# Patient Record
Sex: FEMALE | Race: WHITE | NOT HISPANIC OR LATINO | Employment: FULL TIME | URBAN - METROPOLITAN AREA
[De-identification: names, ages, dates, MRNs, and addresses within clinical notes are randomized per-mention and may not be internally consistent; named-entity substitution may affect disease eponyms.]

---

## 2022-11-23 ENCOUNTER — OFFICE VISIT (OUTPATIENT)
Dept: INTERNAL MEDICINE CLINIC | Facility: OTHER | Age: 67
End: 2022-11-23

## 2022-11-23 VITALS
DIASTOLIC BLOOD PRESSURE: 68 MMHG | SYSTOLIC BLOOD PRESSURE: 104 MMHG | WEIGHT: 96.2 LBS | TEMPERATURE: 98.6 F | OXYGEN SATURATION: 98 % | BODY MASS INDEX: 16.43 KG/M2 | HEIGHT: 64 IN | RESPIRATION RATE: 18 BRPM | HEART RATE: 47 BPM

## 2022-11-23 DIAGNOSIS — Z13.820 ENCOUNTER FOR OSTEOPOROSIS SCREENING IN ASYMPTOMATIC POSTMENOPAUSAL PATIENT: ICD-10-CM

## 2022-11-23 DIAGNOSIS — M16.10 HIP ARTHRITIS: ICD-10-CM

## 2022-11-23 DIAGNOSIS — Z13.6 ENCOUNTER FOR LIPID SCREENING FOR CARDIOVASCULAR DISEASE: ICD-10-CM

## 2022-11-23 DIAGNOSIS — F51.04 CHRONIC INSOMNIA: ICD-10-CM

## 2022-11-23 DIAGNOSIS — E44.1 MILD PROTEIN-CALORIE MALNUTRITION (HCC): ICD-10-CM

## 2022-11-23 DIAGNOSIS — Z12.11 SCREENING FOR COLON CANCER: ICD-10-CM

## 2022-11-23 DIAGNOSIS — Z13.220 ENCOUNTER FOR LIPID SCREENING FOR CARDIOVASCULAR DISEASE: ICD-10-CM

## 2022-11-23 DIAGNOSIS — Z12.31 ENCOUNTER FOR SCREENING MAMMOGRAM FOR MALIGNANT NEOPLASM OF BREAST: ICD-10-CM

## 2022-11-23 DIAGNOSIS — Z78.0 ENCOUNTER FOR OSTEOPOROSIS SCREENING IN ASYMPTOMATIC POSTMENOPAUSAL PATIENT: ICD-10-CM

## 2022-11-23 DIAGNOSIS — Z11.59 NEED FOR HEPATITIS C SCREENING TEST: ICD-10-CM

## 2022-11-23 DIAGNOSIS — Z53.20 SCREENING FOR HEPATITIS C DECLINED: ICD-10-CM

## 2022-11-23 DIAGNOSIS — M85.88 OSTEOPENIA OF LUMBAR SPINE: Primary | ICD-10-CM

## 2022-11-23 DIAGNOSIS — K21.9 GERD WITHOUT ESOPHAGITIS: ICD-10-CM

## 2022-11-23 DIAGNOSIS — S76.309D HAMSTRING INJURY, SUBSEQUENT ENCOUNTER: ICD-10-CM

## 2022-11-23 PROBLEM — N20.0 KIDNEY STONES: Status: ACTIVE | Noted: 2022-11-23

## 2022-11-23 PROBLEM — N20.0 KIDNEY STONES: Status: RESOLVED | Noted: 2022-11-23 | Resolved: 2022-11-23

## 2022-11-23 RX ORDER — GABAPENTIN 600 MG/1
300 TABLET ORAL 2 TIMES DAILY
COMMUNITY
Start: 2022-11-07

## 2022-11-23 RX ORDER — LIDOCAINE 50 MG/G
1 PATCH TOPICAL DAILY
COMMUNITY
Start: 2022-11-15

## 2022-11-23 RX ORDER — IBUPROFEN 800 MG/1
800 TABLET ORAL 2 TIMES DAILY WITH MEALS
COMMUNITY
Start: 2022-11-07

## 2022-11-23 RX ORDER — PANTOPRAZOLE SODIUM 40 MG/1
40 TABLET, DELAYED RELEASE ORAL DAILY
COMMUNITY

## 2022-11-23 RX ORDER — TRAZODONE HYDROCHLORIDE 50 MG/1
TABLET ORAL
COMMUNITY
Start: 2022-11-07

## 2022-11-23 RX ORDER — METHOCARBAMOL 500 MG/1
500 TABLET, FILM COATED ORAL
COMMUNITY
Start: 2022-11-07

## 2022-11-23 RX ORDER — ESZOPICLONE 1 MG/1
TABLET, FILM COATED ORAL
COMMUNITY
Start: 2022-11-07

## 2022-11-23 NOTE — PROGRESS NOTES
Assessment/Plan:    GERD without esophagitis  Continue pantoprazole 40 mg daily  Hamstring injury, subsequent encounter  Will refer to sports Medicine  Continue gabapentin 300 mg twice a day and ibuprofen 800 mg twice a day  Continue pantoprazole while on ibuprofen  Osteopenia of lumbar spine  Discussed calcium and vitamin-D supplementation  Will check DEXA scan  Hip arthritis  Will refer to sports Medicine  Chronic insomnia  Continue Lunesta 1 mg at bedtime as needed for insomnia and trazodone 50 mg at bedtime as needed for insomnia  Diagnoses and all orders for this visit:    Osteopenia of lumbar spine  -     DXA bone density spine hip and pelvis; Future    Encounter for screening mammogram for malignant neoplasm of breast    Screening for colon cancer  -     Cologuard    Encounter for osteoporosis screening in asymptomatic postmenopausal patient    Hip arthritis  -     Ambulatory Referral to Sports Medicine; Future    Hamstring injury, subsequent encounter  -     Ambulatory Referral to Sports Medicine; Future    GERD without esophagitis    Encounter for lipid screening for cardiovascular disease  -     CBC; Future  -     Comprehensive metabolic panel; Future  -     Lipid panel; Future    Screening for hepatitis C declined    Need for hepatitis C screening test  -     Hepatitis C antibody; Future    Chronic insomnia  -     CBC; Future  -     Comprehensive metabolic panel; Future  -     Lipid panel; Future    Mild protein-calorie malnutrition ([address] )    Other orders  -     eszopiclone (LUNESTA) 1 mg tablet; TAKE 1 TABLET(1 MG) BY MOUTH AT BEDTIME AS NEEDED FOR SLEEP  -     gabapentin (NEURONTIN) 600 MG tablet; Take 300 mg by mouth 2 (two) times a day  -     traZODone (DESYREL) 50 mg tablet; TAKE 1 TABLET(50 MG) BY MOUTH AT NIGHT AS NEEDED FOR SLEEP  -     methocarbamol (ROBAXIN) 500 mg tablet;  Take 500 mg by mouth Takes 250 mg 2 times daily  -     lidocaine (LIDODERM) 5 %; 1 patch daily  - ibuprofen (MOTRIN) 800 mg tablet; Take 800 mg by mouth 2 (two) times a day with meals 2-3 times daily  -     pantoprazole (PROTONIX) 40 mg tablet; Take 40 mg by mouth daily                Subjective:      Patient ID: Taylor Campbell is a 79 y o  female  Chief Complaint   Patient presents with   • Establish Care   • hm     Hep c, PHQ, BMI Adult, BMI f/u plan, mammo, cologuard, dexa    • referral sports medicine     Hamstring issue that she has had for 4 yrs       79year old female is seen today to establish care  She has a PMHx of GERD, insomnia, right hamstring pain, bilateral hips arthritis, and osteopenia  She use to see sports medicine and received steroid injections to the right hamstring, most recent injection was 11/2/2022  She takes lunesta and trazodone for insomnia  She has been compliant with her medication regimen  The following portions of the patient's history were reviewed and updated as appropriate: allergies, current medications, past family history, past medical history, past social history, past surgical history and problem list     Review of Systems   Constitutional: Negative for activity change, appetite change, chills, diaphoresis, fatigue and fever  HENT: Negative for congestion, postnasal drip, rhinorrhea, sinus pressure, sinus pain, sneezing and sore throat  Eyes: Negative for visual disturbance  Respiratory: Negative for apnea, cough, choking, chest tightness, shortness of breath and wheezing  Cardiovascular: Negative for chest pain, palpitations and leg swelling  Gastrointestinal: Negative for abdominal distention, abdominal pain, anal bleeding, blood in stool, constipation, diarrhea, nausea and vomiting  Endocrine: Negative for cold intolerance and heat intolerance  Genitourinary: Negative for difficulty urinating, dysuria and hematuria  Musculoskeletal: Negative  Skin: Negative      Neurological: Negative for dizziness, weakness, light-headedness, numbness and headaches  Hematological: Negative for adenopathy  Psychiatric/Behavioral: Negative for agitation, sleep disturbance and suicidal ideas  All other systems reviewed and are negative  Past Medical History:   Diagnosis Date   • Kidney stones 11/23/2022   • Osteoporosis          Current Outpatient Medications:   •  eszopiclone (LUNESTA) 1 mg tablet, TAKE 1 TABLET(1 MG) BY MOUTH AT BEDTIME AS NEEDED FOR SLEEP, Disp: , Rfl:   •  gabapentin (NEURONTIN) 600 MG tablet, Take 300 mg by mouth 2 (two) times a day, Disp: , Rfl:   •  ibuprofen (MOTRIN) 800 mg tablet, Take 800 mg by mouth 2 (two) times a day with meals 2-3 times daily, Disp: , Rfl:   •  lidocaine (LIDODERM) 5 %, 1 patch daily, Disp: , Rfl:   •  methocarbamol (ROBAXIN) 500 mg tablet, Take 500 mg by mouth Takes 250 mg 2 times daily, Disp: , Rfl:   •  pantoprazole (PROTONIX) 40 mg tablet, Take 40 mg by mouth daily, Disp: , Rfl:   •  traZODone (DESYREL) 50 mg tablet, TAKE 1 TABLET(50 MG) BY MOUTH AT NIGHT AS NEEDED FOR SLEEP, Disp: , Rfl:     No Known Allergies    Social History   Past Surgical History:   Procedure Laterality Date   • ROTATOR CUFF REPAIR Right      Family History   Problem Relation Age of Onset   • Lung cancer Mother    • Breast cancer Maternal Aunt        Objective:  /68 (BP Location: Left arm, Patient Position: Sitting, Cuff Size: Standard)   Pulse (!) 47   Temp 98 6 °F (37 °C) (Temporal)   Resp 18   Ht 5' 3 5" (1 613 m)   Wt 43 6 kg (96 lb 3 2 oz)   SpO2 98%   BMI 16 77 kg/m²     No results found for this or any previous visit (from the past 1344 hour(s))  Physical Exam  Vitals and nursing note reviewed  Constitutional:       General: She is not in acute distress  Appearance: She is well-developed and well-nourished  She is not diaphoretic  HENT:      Head: Normocephalic and atraumatic  Eyes:      General:         Right eye: No discharge  Left eye: No discharge        Extraocular Movements: EOM normal       Conjunctiva/sclera: Conjunctivae normal       Pupils: Pupils are equal, round, and reactive to light  Neck:      Thyroid: No thyromegaly  Vascular: No JVD  Cardiovascular:      Rate and Rhythm: Normal rate and regular rhythm  Pulses: Intact distal pulses  Heart sounds: Normal heart sounds  No murmur heard  No friction rub  No gallop  Pulmonary:      Effort: Pulmonary effort is normal  No respiratory distress  Breath sounds: Normal breath sounds  No wheezing or rales  Chest:      Chest wall: No tenderness  Abdominal:      General: There is no distension  Palpations: Abdomen is soft  Tenderness: There is no abdominal tenderness  Musculoskeletal:         General: No tenderness, deformity or edema  Normal range of motion  Cervical back: Normal range of motion and neck supple  Lymphadenopathy:      Cervical: No cervical adenopathy  Skin:     General: Skin is warm and dry  Coloration: Skin is not pale  Findings: No erythema or rash  Neurological:      Mental Status: She is alert and oriented to person, place, and time  Cranial Nerves: No cranial nerve deficit  Coordination: Coordination normal    Psychiatric:         Mood and Affect: Mood and affect normal          Behavior: Behavior normal          Thought Content:  Thought content normal          Judgment: Judgment normal

## 2022-11-23 NOTE — ASSESSMENT & PLAN NOTE
Continue Lunesta 1 mg at bedtime as needed for insomnia and trazodone 50 mg at bedtime as needed for insomnia

## 2022-11-23 NOTE — ASSESSMENT & PLAN NOTE
Will refer to sports Medicine  Continue gabapentin 300 mg twice a day and ibuprofen 800 mg twice a day  Continue pantoprazole while on ibuprofen

## 2022-11-28 ENCOUNTER — TELEPHONE (OUTPATIENT)
Dept: ADMINISTRATIVE | Facility: OTHER | Age: 67
End: 2022-11-28

## 2022-11-28 NOTE — TELEPHONE ENCOUNTER
----- Message from Monica Clarke MA sent at 11/25/2022  3:07 PM EST -----  Regarding: colonoscopy  11/25/22 3:08 PM    Joan, our patient Piper Mayfield has had CRC: Colonoscopy completed/performed  Please assist in updating the patient chart by making an External outreach to [facility] located in [place]  The date of service is unknown      Thank you,  Monica Clarke, [address]  [facility]

## 2022-11-28 NOTE — TELEPHONE ENCOUNTER
Upon review of the In Basket request and the patient's chart, initial outreach has been made via fax to facility  Please see Contacts section for details       Thank you  Shirley Proctor MA

## 2022-11-28 NOTE — LETTER
Procedure Request Form: Colonoscopy      Date Requested: 22  Patient: Cheryle Ek  Patient : 1955   Referring Provider: Eloise Wisdom, [address] [facility]    Date of Procedure ______________________________       The above patient has informed us that they have completed their   most recent Colonoscopy at your facility  Please complete   this form and attach all corresponding procedure reports/results  Comments __________________________________________________________  ____________________________________________________________________  ____________________________________________________________________  ____________________________________________________________________    Facility Completing Procedure _________________________________________    Form Completed By (print name) _______________________________________      Signature __________________________________________________________      These reports are needed for  compliance  Please fax this completed form and a copy of the procedure report to our office located at [place] [address] as soon as possible to [phone] lavonne Quinonez: Phone [phone]    We thank you for your assistance in treating our mutual patient

## 2022-11-28 NOTE — LETTER
Procedure Request Form: Colonoscopy  Second Request      Date Requested: 22  Patient: Adriana Eugene  Patient : 1955   Referring Provider: Rachel Weiss MD    [address] [place], [address]    Date of Procedure ______________________________       The above patient has informed us that they have completed their   most recent Colonoscopy at your facility  Please complete   this form and attach all corresponding procedure reports/results  Comments __________________________________________________________  ____________________________________________________________________  ____________________________________________________________________  ____________________________________________________________________    Facility Completing Procedure _________________________________________    Form Completed By (print name) _______________________________________      Signature __________________________________________________________      These reports are needed for  compliance  Please fax this completed form and a copy of the procedure report to our office located at [place] [address] as soon as possible to [phone] lavonne Palma: Phone [phone]    We thank you for your assistance in treating our mutual patient

## 2022-12-02 NOTE — TELEPHONE ENCOUNTER
As a follow-up, a second attempt has been made for outreach via fax to facility  Please see Contacts section for details      Thank you  Oibe Flood MA

## 2022-12-05 NOTE — TELEPHONE ENCOUNTER
Upon review of the In Basket request we have found as a result of outreach that patient did not have the requested item(s) completed  Any additional questions or concerns should be emailed to the Practice Liaisons via the appropriate education email address, please do not reply via In Basket      Thank you  Génesis Olea MA

## 2022-12-12 DIAGNOSIS — R19.5 POSITIVE COLORECTAL CANCER SCREENING USING COLOGUARD TEST: Primary | ICD-10-CM

## 2022-12-12 DIAGNOSIS — F51.04 CHRONIC INSOMNIA: Primary | ICD-10-CM

## 2022-12-12 LAB — COLOGUARD RESULT REPORTABLE: POSITIVE

## 2022-12-12 NOTE — TELEPHONE ENCOUNTER
Patient called for refill request on Lunesta - asked to send to [place] on [address]  Last appt 11-  No future appt scheduled    I asked patient if she would like to schedule next visit  She stated she broke her shoulder and had surgery  She is waiting to schedule next follow up visit after her follow up for her shoulder

## 2022-12-13 DIAGNOSIS — R19.5 POSITIVE COLORECTAL CANCER SCREENING USING COLOGUARD TEST: ICD-10-CM

## 2022-12-13 DIAGNOSIS — Z12.12 ENCOUNTER FOR COLORECTAL CANCER SCREENING: Primary | ICD-10-CM

## 2022-12-13 DIAGNOSIS — Z12.11 ENCOUNTER FOR COLORECTAL CANCER SCREENING: Primary | ICD-10-CM

## 2022-12-13 RX ORDER — ESZOPICLONE 1 MG/1
1 TABLET, FILM COATED ORAL
Qty: 30 TABLET | Refills: 0 | Status: SHIPPED | OUTPATIENT
Start: 2022-12-13

## 2022-12-13 NOTE — PROGRESS NOTES
Requesting repeat Cologuard testing she feels her positive Cologuard test may be a false positive as she was experiencing constipation  She is adamant about repeating the cologuard test as oppose to undergoing colonoscopy

## 2023-01-09 ENCOUNTER — PATIENT MESSAGE (OUTPATIENT)
Dept: INTERNAL MEDICINE CLINIC | Facility: OTHER | Age: 68
End: 2023-01-09

## 2023-01-09 DIAGNOSIS — F51.04 CHRONIC INSOMNIA: ICD-10-CM

## 2023-01-11 RX ORDER — ESZOPICLONE 1 MG/1
1 TABLET, FILM COATED ORAL
Qty: 30 TABLET | Refills: 0 | Status: SHIPPED | OUTPATIENT
Start: 2023-01-11

## 2023-01-30 ENCOUNTER — TELEPHONE (OUTPATIENT)
Dept: INTERNAL MEDICINE CLINIC | Facility: OTHER | Age: 68
End: 2023-01-30

## 2023-01-30 NOTE — TELEPHONE ENCOUNTER
----- Message from Karlie Benito sent at 1/17/2023  5:31 PM EST -----  Patient scheduled for 2/15/23    ----- Message -----  From: Veronica Villalpando  Sent: 12/27/2022   2:34 PM EST  To: Erin Kennedy, #    Schedule AWV

## 2023-01-30 NOTE — TELEPHONE ENCOUNTER
Patient canceled because she currently has no insurance and is going to call as soon as she gets insurance again.

## 2023-02-07 DIAGNOSIS — S76.309D HAMSTRING INJURY, SUBSEQUENT ENCOUNTER: Primary | ICD-10-CM

## 2023-02-07 DIAGNOSIS — F51.04 CHRONIC INSOMNIA: ICD-10-CM

## 2023-02-07 RX ORDER — TRAZODONE HYDROCHLORIDE 50 MG/1
50 TABLET ORAL
Qty: 30 TABLET | Refills: 0 | Status: SHIPPED | OUTPATIENT
Start: 2023-02-07

## 2023-02-07 RX ORDER — ESZOPICLONE 1 MG/1
1 TABLET, FILM COATED ORAL
Qty: 30 TABLET | Refills: 0 | Status: SHIPPED | OUTPATIENT
Start: 2023-02-07

## 2023-02-07 RX ORDER — METHOCARBAMOL 500 MG/1
250 TABLET, FILM COATED ORAL 2 TIMES DAILY
Qty: 30 TABLET | Refills: 0 | Status: SHIPPED | OUTPATIENT
Start: 2023-02-07

## 2023-02-14 ENCOUNTER — OFFICE VISIT (OUTPATIENT)
Dept: OBGYN CLINIC | Facility: MEDICAL CENTER | Age: 68
End: 2023-02-14

## 2023-02-14 VITALS
HEIGHT: 64 IN | HEART RATE: 54 BPM | SYSTOLIC BLOOD PRESSURE: 104 MMHG | DIASTOLIC BLOOD PRESSURE: 60 MMHG | WEIGHT: 98 LBS | BODY MASS INDEX: 16.73 KG/M2

## 2023-02-14 DIAGNOSIS — M16.0 PRIMARY OSTEOARTHRITIS OF HIPS, BILATERAL: ICD-10-CM

## 2023-02-14 DIAGNOSIS — M25.551 CHRONIC RIGHT HIP PAIN: Primary | ICD-10-CM

## 2023-02-14 DIAGNOSIS — G89.29 CHRONIC RIGHT HIP PAIN: Primary | ICD-10-CM

## 2023-02-14 NOTE — PROGRESS NOTES
1  Chronic right hip pain  Large joint arthrocentesis: R hip joint      2  Primary osteoarthritis of hips, bilateral  Large joint arthrocentesis: R hip joint        Orders Placed This Encounter   Procedures   • Large joint arthrocentesis: R hip joint        Imaging Studies (I personally reviewed images in PACS and report):    • Ultrasound non-OB transvaginal 6/21/2022: Stable simple appearing right adnexal cyst   Consider 6 to 12-month follow-up pelvic ultrasound  Nonvisualization of the left ovary  Otherwise unremarkable evaluation  • MRI right hip without contrast 2/17/2022: Via [facility]  Images are not available but report is available noting mild right and moderate left hip degenerative changes  Stable right pelvic cystic lesion  • MRI right hip without contrast 11/4/2020: Only report is available noting mild right hip and moderate left hip osteoarthritic changes  No fracture or evidence of osteonecrosis  4 cm right pelvic cystic lesion appears simple, and may be ovarian  • MRI lumbar spine via [facility] in [facility] practices:  1    NONSPECIFIC DEGENERATIVE SPONDYLOSIS SEEN THROUGHOUT THE MID        LUMBAR SPINE  2    NO SIGNIFICANT DISC PATHOLOGY IS SEEN  3    CENTRAL CANAL STENOSIS SEEN AT L4-L5 RELATED PRIMARILY TO        HYPERTROPHIC CHANGES POSTERIORLY   SEE ABOVE DISCUSSION  IMPRESSION:  • Chronic atraumatic right hip posteromedial pain - ongoing for about 4 years  • Prior imaging noting primary OA of bilateral hips  • Reportedly underwent injections of right hip in the past with limited relief  Cannot confirm specifically what was performed  Reports undergoing formal PT for this issue previously with limited relief  • Aggravated when running/ascending/descending  • Neurologically intact of RLE  Full hip ROM, strength on exam      PLAN:    • Clinical exam and radiographic imaging reviewed with patient today, with impression as per above   I have discussed with the patient the pathophysiology of this diagnosis and reviewed how the examination correlates with this diagnosis  • Treatment options were discussed at length and after discussing these treatment options, the patient elected to undergo an ultrasound-guided cortisone injection of her right hip today as per procedure note below  Patient wait until the end of the day to come in around the afternoon to have this procedure done  • Counseled he states that injections can be repeated every 3 to 4 months as needed provided it gives at least 3 months of relief  • Based on patient's description of an injection of her "hamstrings" in the past, I suspect that she may have had an ischial bursa cortisone injection which I am unfamiliar with  We could consider this is an injection for the future from an outside specialty, however on clinical exam any testing done to exacerbate hamstring tension did not exacerbate her pain; nor was she painful over the ischial tuberosity at the origin of her hamstrings  • Patient states that she will follow-up as needed if she feels that this injection is not providing sufficient relief  • Her prior reports/studies were unable to be scanned in today in office and I suggest patient to bring in on follow-up to her chart  Return if symptoms worsen or fail to improve  Portions of the record may have been created with voice recognition software  Occasional wrong word or "sound a like" substitutions may have occurred due to the inherent limitations of voice recognition software  Read the chart carefully and recognize, using context, where substitutions have occurred       I have spent a total time of 50 minutes on 02/16/23 in caring for this patient including Diagnostic results, Prognosis, Risks and benefits of tx options, Instructions for management, Patient and family education, Importance of tx compliance, Risk factor reductions, Impressions, Counseling / Coordination of care, Documenting in the medical record, Reviewing / ordering tests, medicine, procedures   and Obtaining or reviewing history    CHIEF COMPLAINT:  Chief Complaint   Patient presents with   • Right Leg - Pain         HPI:  Carrie Jaimes is a 79 y o  female  who presents for       Visit 2/14/2023:  Initial evaluation of right hip pain:  Ongoing off and on for 4 years  Patient attributes it to her very active lifestyle  She states that it originally was aggravated as she used to run uphill frequently  She also feels that a fall from rollerblading initially exacerbated the issue  She complains of pain has been located over the posteromedial aspect of her hip and has been told in the past that it was from her hamstrings -she points to around her ischial tuberosity  She states that she has undergone formal physical therapy of this issue several times  She states that she had a PRP injection at 1 point without relief  She states that she also had a cortisone injection somewhere along her posterior hip (on chart review I cannot confirm exactly where patient had a cortisone injection) and states that she did have some relief with this  She also had an EMG on 7/12/2022 that was reportedly unremarkable  She has brought in several forms today of her studies that have been done  There are also prior imaging studies as noted above  Patient continues to report pain over the posteromedial aspect of her hip that is aggravated from certain range of motion movements of her hip  Denies any crepitus of her hip  Denies any radiation of pain  Describes as a sharp/aching pain  States it is mild to moderate intensity  States it is not completely stopped her from doing her activities of daily living nor has it woken her up at night but she feels it is continually been chronic without alleviation and is interested in other treatment modalities going forward        Medical, Surgical, Family, and Social History    Past Medical History: Diagnosis Date   • Kidney stones 11/23/2022   • Osteoporosis      Past Surgical History:   Procedure Laterality Date   • ROTATOR CUFF REPAIR Right      Social History   Social History     Substance and Sexual Activity   Alcohol Use Not Currently     Social History     Substance and Sexual Activity   Drug Use Not Currently   • Types: Amphetamines    Comment: in college     Social History     Tobacco Use   Smoking Status Never   Smokeless Tobacco Never     Family History   Problem Relation Age of Onset   • Lung cancer Mother    • Breast cancer Maternal Aunt      No Known Allergies       Physical Exam  /60   Pulse (!) 54   Ht 5' 3 5" (1 613 m)   Wt 44 5 kg (98 lb)   BMI 17 09 kg/m²     Constitutional:  see vital signs  Gen: well-developed, normocephalic/atraumatic, well-groomed  Pulmonary/Chest: Effort normal  No respiratory distress  Right Hip Exam     Tenderness   The patient is experiencing no tenderness  Range of Motion   Flexion: 100   External rotation: 60   Internal rotation: 30     Muscle Strength   Abduction: 5/5   Adduction: 5/5   Flexion: 5/5     Tests   PATRICIA: negative  Shaneka: negative    Comments:  Single-leg bridge: denies pain of hip  FADIR: negative              Large joint arthrocentesis: R hip joint  Universal Protocol:  Consent: Verbal consent obtained  Risks and benefits: risks, benefits and alternatives were discussed  Consent given by: patient  Patient understanding: patient states understanding of the procedure being performed  Site marked: the operative site was marked  Radiology Images displayed and confirmed   If images not available, report reviewed: imaging studies available  Required items: required blood products, implants, devices, and special equipment available  Patient identity confirmed: verbally with patient    Supporting Documentation  Indications: pain   Procedure Details  Location: hip - R hip joint  Preparation: Patient was prepped and draped in the usual sterile fashion  Needle size: 22 G (5" spinal needle)  Ultrasound guidance: yes  Medications administered: 80 mg triamcinolone acetonide 40 mg/mL; 8 mL bupivacaine 0 25 %    Patient tolerance: patient tolerated the procedure well with no immediate complications  Dressing:  Sterile dressing applied    Right  HIP USG INTRA-ARTICULAR INJECTION:  Injection site cleaned with Betadine as well as alcohol prior to anesthetic injection using 5 cc of anesthetic without epinephrine prior to beginning ultrasound-guided  injection procedure  Injection site again cleaned with chlorhexidine prior to starting USG procedure  Sterile field, gloves, probe cover used with visualization of needle in-line with curvilinear transducer LAX to femoral neck with filling of joint capsule at head-neck junction with injectate of anesthetic plus steroid  Femoral artery and circumflex artery visualized medial to injection site on pre-scanning with doppler confirmation  No artery or nerve visualized in path of needle  Patient tolerated procedure well with minimal bleeding and no complications

## 2023-02-16 PROBLEM — M16.0 PRIMARY OSTEOARTHRITIS OF HIPS, BILATERAL: Status: ACTIVE | Noted: 2023-02-16

## 2023-02-16 RX ORDER — TRIAMCINOLONE ACETONIDE 40 MG/ML
80 INJECTION, SUSPENSION INTRA-ARTICULAR; INTRAMUSCULAR
Status: COMPLETED | OUTPATIENT
Start: 2023-02-16 | End: 2023-02-16

## 2023-02-16 RX ORDER — BUPIVACAINE HYDROCHLORIDE 2.5 MG/ML
8 INJECTION, SOLUTION INFILTRATION; PERINEURAL
Status: COMPLETED | OUTPATIENT
Start: 2023-02-16 | End: 2023-02-16

## 2023-02-16 RX ADMIN — TRIAMCINOLONE ACETONIDE 80 MG: 40 INJECTION, SUSPENSION INTRA-ARTICULAR; INTRAMUSCULAR at 07:59

## 2023-02-16 RX ADMIN — BUPIVACAINE HYDROCHLORIDE 8 ML: 2.5 INJECTION, SOLUTION INFILTRATION; PERINEURAL at 07:59

## 2023-02-16 NOTE — PATIENT INSTRUCTIONS

## 2023-02-23 ENCOUNTER — PATIENT MESSAGE (OUTPATIENT)
Dept: INTERNAL MEDICINE CLINIC | Facility: OTHER | Age: 68
End: 2023-02-23

## 2023-02-23 DIAGNOSIS — K21.9 GERD WITHOUT ESOPHAGITIS: ICD-10-CM

## 2023-02-23 DIAGNOSIS — M16.10 HIP ARTHRITIS: ICD-10-CM

## 2023-02-23 DIAGNOSIS — S76.309D HAMSTRING INJURY, SUBSEQUENT ENCOUNTER: Primary | ICD-10-CM

## 2023-02-24 RX ORDER — IBUPROFEN 800 MG/1
800 TABLET ORAL 2 TIMES DAILY WITH MEALS
Qty: 60 TABLET | Refills: 2 | Status: SHIPPED | OUTPATIENT
Start: 2023-02-24

## 2023-02-24 RX ORDER — GABAPENTIN 600 MG/1
300 TABLET ORAL 2 TIMES DAILY
Qty: 30 TABLET | Refills: 2 | Status: SHIPPED | OUTPATIENT
Start: 2023-02-24

## 2023-02-24 RX ORDER — PANTOPRAZOLE SODIUM 40 MG/1
40 TABLET, DELAYED RELEASE ORAL DAILY
Qty: 30 TABLET | Refills: 2 | Status: SHIPPED | OUTPATIENT
Start: 2023-02-24

## 2023-02-24 NOTE — PATIENT COMMUNICATION
Pt stated she did go to Sport Medicine as recommended at last ov  They gave her a Kenalog injection in her hip

## 2023-03-12 DIAGNOSIS — F51.04 CHRONIC INSOMNIA: ICD-10-CM

## 2023-03-13 RX ORDER — ESZOPICLONE 1 MG/1
1 TABLET, FILM COATED ORAL
Qty: 30 TABLET | Refills: 0 | Status: SHIPPED | OUTPATIENT
Start: 2023-03-13

## 2023-03-13 RX ORDER — TRAZODONE HYDROCHLORIDE 50 MG/1
50 TABLET ORAL
Qty: 30 TABLET | Refills: 0 | Status: SHIPPED | OUTPATIENT
Start: 2023-03-13

## 2023-03-15 DIAGNOSIS — S76.309D HAMSTRING INJURY, SUBSEQUENT ENCOUNTER: ICD-10-CM

## 2023-03-16 RX ORDER — METHOCARBAMOL 500 MG/1
250 TABLET, FILM COATED ORAL 2 TIMES DAILY
Qty: 30 TABLET | Refills: 0 | Status: SHIPPED | OUTPATIENT
Start: 2023-03-16

## 2023-03-23 ENCOUNTER — OFFICE VISIT (OUTPATIENT)
Dept: INTERNAL MEDICINE CLINIC | Facility: OTHER | Age: 68
End: 2023-03-23

## 2023-03-23 VITALS
SYSTOLIC BLOOD PRESSURE: 100 MMHG | HEIGHT: 64 IN | OXYGEN SATURATION: 98 % | BODY MASS INDEX: 15.71 KG/M2 | HEART RATE: 70 BPM | TEMPERATURE: 98.2 F | WEIGHT: 92 LBS | DIASTOLIC BLOOD PRESSURE: 60 MMHG

## 2023-03-23 DIAGNOSIS — M16.10 HIP ARTHRITIS: ICD-10-CM

## 2023-03-23 DIAGNOSIS — S76.309D HAMSTRING INJURY, SUBSEQUENT ENCOUNTER: ICD-10-CM

## 2023-03-23 DIAGNOSIS — F51.04 CHRONIC INSOMNIA: Primary | ICD-10-CM

## 2023-03-23 DIAGNOSIS — N64.82 MICROMASTIA: ICD-10-CM

## 2023-03-23 DIAGNOSIS — E44.1 MILD PROTEIN-CALORIE MALNUTRITION (HCC): ICD-10-CM

## 2023-03-23 DIAGNOSIS — K21.9 GERD WITHOUT ESOPHAGITIS: ICD-10-CM

## 2023-03-23 DIAGNOSIS — M85.88 OSTEOPENIA OF LUMBAR SPINE: ICD-10-CM

## 2023-03-23 RX ORDER — LIDOCAINE 50 MG/G
1 PATCH TOPICAL DAILY
Qty: 30 PATCH | Refills: 5 | Status: SHIPPED | OUTPATIENT
Start: 2023-03-23

## 2023-03-23 RX ORDER — NUTRITIONAL SUPPLEMENT
1 POWDER (GRAM) ORAL DAILY
COMMUNITY

## 2023-03-23 NOTE — ASSESSMENT & PLAN NOTE
She would like to wean off lunesta  She is hopeful that the Coco Darcie will help with her hamstring pain and if so will wean off lunesta  Continue lunesta and trazodone 50 mg HS PRN

## 2023-03-23 NOTE — PROGRESS NOTES
Assessment/Plan:    Mild protein-calorie malnutrition ([address] )  Discussed diet and exercise  Hamstring injury, subsequent encounter  Discussed ROM and stretching exercises  Continue methocarbamol 250 mg BID PRN, lidocaine patches, and Deandre supplementation  Hip arthritis  Continue gabapentin and current pain regimen and follow-up with orthopedic surgery as needed  Osteopenia of lumbar spine  Continue calcium and vitamin D supplementation  DEXA scan ordered  GERD without esophagitis  Controlled with pantoprazole 40 mg daily PRN  Chronic insomnia  She would like to wean off lunesta  She is hopeful that the [employer] will help with her hamstring pain and if so will wean off lunesta  Continue lunesta and trazodone 50 mg HS PRN  Micromastia  We will order an ABUS for breast cancer screening due to micromastia  She has tried mammograms in the past however was not able to have completed due to micromastia  Diagnoses and all orders for this visit:    Chronic insomnia    Osteopenia of lumbar spine  -     DXA bone density spine hip and pelvis; Future    Hamstring injury, subsequent encounter  -     lidocaine (LIDODERM) 5 %; Apply 1 patch topically over 24 hours daily    Hip arthritis  -     lidocaine (LIDODERM) 5 %; Apply 1 patch topically over 24 hours daily    Mild protein-calorie malnutrition (HCC)    GERD without esophagitis    Micromastia  -     US breast screening bilateral complete (ABUS); Future    Other orders  -     Nutritional Supplements (Deandre) POWD; Take 1 Package by mouth in the morning        BMI Counseling: Body mass index is 16 04 kg/m²  The BMI is below normal  Patient advised to gain weight  Patient referred to PCP  Rationale for BMI follow-up plan is due to patient being underweight  Depression Screening and Follow-up Plan: Patient was screened for depression during today's encounter  They screened negative with a PHQ-2 score of 0             Subjective:      Patient ID: Cristofer Dawsontomasa Latasha Guajardo is a 79 y o  female  Chief Complaint   Patient presents with   • Follow-up     Pt came out of Medicare, She has regular commercial insurance now  NO AWV TODAY ( could not obtain info from colonoscopy @ [facility] in [facility], Patient states she has been using phenergan injections    • HM     Depression sc, Mammo- refused , DXA-order placed        79year old female is seen today for follow up of chronic conditions           The following portions of the patient's history were reviewed and updated as appropriate: allergies, current medications, past family history, past medical history, past social history, past surgical history and problem list     Review of Systems      Past Medical History:   Diagnosis Date   • Kidney stones 11/23/2022   • Osteoporosis          Current Outpatient Medications:   •  eszopiclone (LUNESTA) 1 mg tablet, Take 1 tablet (1 mg total) by mouth daily at bedtime as needed for sleep Take immediately before bedtime, Disp: 30 tablet, Rfl: 0  •  gabapentin (NEURONTIN) 600 MG tablet, Take 0 5 tablets (300 mg total) by mouth 2 (two) times a day, Disp: 30 tablet, Rfl: 2  •  ibuprofen (MOTRIN) 800 mg tablet, Take 1 tablet (800 mg total) by mouth 2 (two) times a day with meals 2-3 times daily, Disp: 60 tablet, Rfl: 2  •  lidocaine (LIDODERM) 5 %, Apply 1 patch topically over 24 hours daily, Disp: 30 patch, Rfl: 5  •  methocarbamol (ROBAXIN) 500 mg tablet, Take 0 5 tablets (250 mg total) by mouth 2 (two) times a day Takes 250 mg 2 times daily, Disp: 30 tablet, Rfl: 0  •  Nutritional Supplements (Deandre) POWD, Take 1 Package by mouth in the morning, Disp: , Rfl:   •  pantoprazole (PROTONIX) 40 mg tablet, Take 1 tablet (40 mg total) by mouth daily, Disp: 30 tablet, Rfl: 2  •  traZODone (DESYREL) 50 mg tablet, Take 1 tablet (50 mg total) by mouth daily at bedtime as needed for sleep, Disp: 30 tablet, Rfl: 0    No Known Allergies    Social History   Past Surgical History:   Procedure Laterality Date   • ROTATOR CUFF REPAIR Right      Family History   Problem Relation Age of Onset   • Lung cancer Mother    • Breast cancer Maternal Aunt        Objective:  /60 (BP Location: Left arm, Patient Position: Sitting, Cuff Size: Standard)   Pulse 70   Temp 98 2 °F (36 8 °C) (Temporal)   Ht 5' 3 5" (1 613 m)   Wt 41 7 kg (92 lb)   SpO2 98%   BMI 16 04 kg/m²     No results found for this or any previous visit (from the past 1344 hour(s))           Physical Exam

## 2023-03-23 NOTE — ASSESSMENT & PLAN NOTE
We will order an ABUS for breast cancer screening due to micromastia  She has tried mammograms in the past however was not able to have completed due to micromastia

## 2023-03-23 NOTE — ASSESSMENT & PLAN NOTE
Discussed ROM and stretching exercises  Continue methocarbamol 250 mg BID PRN, lidocaine patches, and Deandre supplementation

## 2023-03-29 ENCOUNTER — PATIENT MESSAGE (OUTPATIENT)
Dept: INTERNAL MEDICINE CLINIC | Facility: OTHER | Age: 68
End: 2023-03-29

## 2023-03-29 DIAGNOSIS — S76.309D HAMSTRING INJURY, SUBSEQUENT ENCOUNTER: Primary | ICD-10-CM

## 2023-03-29 DIAGNOSIS — M16.10 HIP ARTHRITIS: ICD-10-CM

## 2023-03-29 DIAGNOSIS — M16.0 PRIMARY OSTEOARTHRITIS OF HIPS, BILATERAL: ICD-10-CM

## 2023-03-29 RX ORDER — METHYLPREDNISOLONE 4 MG/1
TABLET ORAL
Qty: 21 EACH | Refills: 0 | Status: SHIPPED | OUTPATIENT
Start: 2023-03-29

## 2023-04-05 DIAGNOSIS — M16.10 HIP ARTHRITIS: ICD-10-CM

## 2023-04-05 DIAGNOSIS — S76.309D HAMSTRING INJURY, SUBSEQUENT ENCOUNTER: ICD-10-CM

## 2023-04-06 RX ORDER — GABAPENTIN 600 MG/1
300 TABLET ORAL 2 TIMES DAILY
Qty: 30 TABLET | Refills: 0 | OUTPATIENT
Start: 2023-04-06

## 2023-04-28 ENCOUNTER — TELEPHONE (OUTPATIENT)
Dept: OBGYN CLINIC | Facility: MEDICAL CENTER | Age: 68
End: 2023-04-28

## 2023-04-28 NOTE — TELEPHONE ENCOUNTER
Caller: Patient    Doctor: Kirk Reynoso    Reason for call:     Patient is calling for another injection, she would like to schedule an ultrasound-guided cortisone injection of her right hip  Last one was done on 02/14/2023  She is asking for a call to schedule this injection       Call back#: [phone]

## 2023-05-16 DIAGNOSIS — S76.309D HAMSTRING INJURY, SUBSEQUENT ENCOUNTER: ICD-10-CM

## 2023-05-16 DIAGNOSIS — F51.04 CHRONIC INSOMNIA: ICD-10-CM

## 2023-05-16 RX ORDER — METHOCARBAMOL 500 MG/1
250 TABLET, FILM COATED ORAL 2 TIMES DAILY
Qty: 30 TABLET | Refills: 2 | Status: SHIPPED | OUTPATIENT
Start: 2023-05-16

## 2023-05-16 RX ORDER — TRAZODONE HYDROCHLORIDE 50 MG/1
50 TABLET ORAL
Qty: 30 TABLET | Refills: 2 | Status: SHIPPED | OUTPATIENT
Start: 2023-05-16

## 2023-05-23 DIAGNOSIS — M16.10 HIP ARTHRITIS: ICD-10-CM

## 2023-05-23 DIAGNOSIS — S76.309D HAMSTRING INJURY, SUBSEQUENT ENCOUNTER: ICD-10-CM

## 2023-05-23 DIAGNOSIS — F51.04 CHRONIC INSOMNIA: ICD-10-CM

## 2023-05-23 RX ORDER — IBUPROFEN 800 MG/1
800 TABLET ORAL 2 TIMES DAILY WITH MEALS
Qty: 60 TABLET | Refills: 0 | Status: SHIPPED | OUTPATIENT
Start: 2023-05-23

## 2023-05-24 RX ORDER — ESZOPICLONE 1 MG/1
1 TABLET, FILM COATED ORAL
Qty: 30 TABLET | Refills: 0 | Status: SHIPPED | OUTPATIENT
Start: 2023-05-24

## 2023-06-05 DIAGNOSIS — M16.10 HIP ARTHRITIS: ICD-10-CM

## 2023-06-05 DIAGNOSIS — S76.309D HAMSTRING INJURY, SUBSEQUENT ENCOUNTER: ICD-10-CM

## 2023-06-05 RX ORDER — GABAPENTIN 600 MG/1
300 TABLET ORAL 2 TIMES DAILY
Qty: 30 TABLET | Refills: 2 | Status: SHIPPED | OUTPATIENT
Start: 2023-06-05

## 2023-06-21 DIAGNOSIS — M16.10 HIP ARTHRITIS: ICD-10-CM

## 2023-06-21 DIAGNOSIS — S76.309D HAMSTRING INJURY, SUBSEQUENT ENCOUNTER: ICD-10-CM

## 2023-06-21 RX ORDER — IBUPROFEN 800 MG/1
800 TABLET ORAL 2 TIMES DAILY WITH MEALS
Qty: 60 TABLET | Refills: 0 | Status: SHIPPED | OUTPATIENT
Start: 2023-06-21

## 2023-06-28 DIAGNOSIS — F51.04 CHRONIC INSOMNIA: ICD-10-CM

## 2023-06-28 RX ORDER — ESZOPICLONE 1 MG/1
1 TABLET, FILM COATED ORAL
Qty: 30 TABLET | Refills: 0 | Status: SHIPPED | OUTPATIENT
Start: 2023-06-28

## 2023-07-11 ENCOUNTER — OFFICE VISIT (OUTPATIENT)
Dept: OBGYN CLINIC | Facility: CLINIC | Age: 68
End: 2023-07-11
Payer: COMMERCIAL

## 2023-07-11 VITALS
WEIGHT: 100.2 LBS | DIASTOLIC BLOOD PRESSURE: 60 MMHG | HEIGHT: 64 IN | SYSTOLIC BLOOD PRESSURE: 100 MMHG | HEART RATE: 55 BPM | BODY MASS INDEX: 17.11 KG/M2 | TEMPERATURE: 98 F

## 2023-07-11 DIAGNOSIS — M16.0 PRIMARY OSTEOARTHRITIS OF HIPS, BILATERAL: ICD-10-CM

## 2023-07-11 DIAGNOSIS — M25.551 CHRONIC RIGHT HIP PAIN: Primary | ICD-10-CM

## 2023-07-11 DIAGNOSIS — G89.29 CHRONIC RIGHT HIP PAIN: Primary | ICD-10-CM

## 2023-07-11 PROCEDURE — S0020 INJECTION, BUPIVICAINE HYDRO: HCPCS | Performed by: STUDENT IN AN ORGANIZED HEALTH CARE EDUCATION/TRAINING PROGRAM

## 2023-07-11 PROCEDURE — 20611 DRAIN/INJ JOINT/BURSA W/US: CPT | Performed by: STUDENT IN AN ORGANIZED HEALTH CARE EDUCATION/TRAINING PROGRAM

## 2023-07-11 PROCEDURE — 99213 OFFICE O/P EST LOW 20 MIN: CPT | Performed by: STUDENT IN AN ORGANIZED HEALTH CARE EDUCATION/TRAINING PROGRAM

## 2023-07-11 RX ORDER — BUPIVACAINE HYDROCHLORIDE 2.5 MG/ML
4 INJECTION, SOLUTION INFILTRATION; PERINEURAL
Status: COMPLETED | OUTPATIENT
Start: 2023-07-11 | End: 2023-07-11

## 2023-07-11 RX ORDER — TRIAMCINOLONE ACETONIDE 40 MG/ML
80 INJECTION, SUSPENSION INTRA-ARTICULAR; INTRAMUSCULAR
Status: COMPLETED | OUTPATIENT
Start: 2023-07-11 | End: 2023-07-11

## 2023-07-11 RX ADMIN — BUPIVACAINE HYDROCHLORIDE 4 ML: 2.5 INJECTION, SOLUTION INFILTRATION; PERINEURAL at 14:15

## 2023-07-11 RX ADMIN — TRIAMCINOLONE ACETONIDE 80 MG: 40 INJECTION, SUSPENSION INTRA-ARTICULAR; INTRAMUSCULAR at 14:15

## 2023-07-11 NOTE — PROGRESS NOTES
1. Chronic right hip pain  Large joint arthrocentesis: R hip joint      2. Primary osteoarthritis of hips, bilateral  Large joint arthrocentesis: R hip joint        Orders Placed This Encounter   Procedures   • Large joint arthrocentesis: R hip joint        Imaging Studies (I personally reviewed images in PACS and report):    • Ultrasound non-OB transvaginal 6/21/2022: Stable simple appearing right adnexal cyst.  Consider 6 to 12-month follow-up pelvic ultrasound. Nonvisualization of the left ovary. Otherwise unremarkable evaluation. • MRI right hip without contrast 2/17/2022: Via [facility]. Images are not available but report is available noting mild right and moderate left hip degenerative changes. Stable right pelvic cystic lesion. • MRI right hip without contrast 11/4/2020: Only report is available noting mild right hip and moderate left hip osteoarthritic changes. No fracture or evidence of osteonecrosis. 4 cm right pelvic cystic lesion appears simple, and may be ovarian. • MRI lumbar spine via [facility] in [facility] practices:  1.   NONSPECIFIC DEGENERATIVE SPONDYLOSIS SEEN THROUGHOUT THE MID        LUMBAR SPINE. 2.   NO SIGNIFICANT DISC PATHOLOGY IS SEEN. 3.   CENTRAL CANAL STENOSIS SEEN AT L4-L5 RELATED PRIMARILY TO        HYPERTROPHIC CHANGES POSTERIORLY.  SEE ABOVE DISCUSSION. IMPRESSION:  • Acute on chronic atraumatic right hip posteromedial pain   • Prior imaging noting primary OA of bilateral hips  • Previously sustained relief from intra-articular hip cortisone injections in the past      PLAN:    • Clinical exam and radiographic imaging reviewed with patient today, with impression as per above. I have discussed with the patient the pathophysiology of this diagnosis and reviewed how the examination correlates with this diagnosis.     • Treatment options were discussed at length and after discussing these treatment options, the patient elected to undergo an ultrasound-guided cortisone injection of her right hip today as per procedure note below. • Counseled he states that injections can be repeated every 3 to 4 months as needed provided it gives at least 3 months of relief. Return if symptoms worsen or fail to improve. Portions of the record may have been created with voice recognition software. Occasional wrong word or "sound a like" substitutions may have occurred due to the inherent limitations of voice recognition software. Read the chart carefully and recognize, using context, where substitutions have occurred. CHIEF COMPLAINT:  Chief Complaint   Patient presents with   • Follow-up         HPI:  Hardy Griffith is a 79 y.o. female  who presents for     Visit 7/11/2023: Follow-up right hip pain:  Patient was last seen on 2/14/2023 in which she was given an ultrasound-guided intra-articular hip injection with cortisone previously. She states she sustained relief from this injection and was doing well for several months. She states recently she has noticed progressively worsening pain over the anterior and posterior aspects of her hip. Denies new injuries. She states the pain is feeling similar as it did previously but not as severe. She is interested in whether she have a repeat injection today before the pain becomes worse. She states she lives a very active lifestyle and the pain can be aggravated from jogging as well as long-term cycling. Denies any hip crepitus. Denies hip swelling. Pain is of mild to moderate intensity.       Medical, Surgical, Family, and Social History    Past Medical History:   Diagnosis Date   • Kidney stones 11/23/2022   • Osteoporosis      Past Surgical History:   Procedure Laterality Date   • ROTATOR CUFF REPAIR Right      Social History   Social History     Substance and Sexual Activity   Alcohol Use Not Currently     Social History     Substance and Sexual Activity   Drug Use Not Currently   • Types: Amphetamines    Comment: in college     Social History     Tobacco Use   Smoking Status Never   Smokeless Tobacco Never     Family History   Problem Relation Age of Onset   • Lung cancer Mother    • Breast cancer Maternal Aunt      No Known Allergies       Physical Exam  /60   Pulse 55   Temp 98 °F (36.7 °C) (Temporal)   Ht 5' 3.5" (1.613 m)   Wt 45.5 kg (100 lb 3.2 oz)   BMI 17.47 kg/m²     Constitutional:  see vital signs  Gen: well-developed, normocephalic/atraumatic, well-groomed  Pulmonary/Chest: Effort normal. No respiratory distress. Right Hip Exam     Tenderness   The patient is experiencing no tenderness. Range of Motion   Flexion: 100   External rotation: 60   Internal rotation: 30     Muscle Strength   Right hip normal muscle strength: Resited hip flexion aggravatesanterior hip pain. Abduction: 5/5   Adduction: 5/5   Flexion: 5/5     Tests   PATRICIA: negative  Shaneka: negative    Other   Erythema: absent    Comments:  Single-leg bridge: denies pain of hip  FADIR: +              Large joint arthrocentesis: R hip joint  Universal Protocol:  Consent: Verbal consent obtained. Risks and benefits: risks, benefits and alternatives were discussed  Consent given by: patient  Patient understanding: patient states understanding of the procedure being performed  Site marked: the operative site was marked  Radiology Images displayed and confirmed.  If images not available, report reviewed: imaging studies available  Required items: required blood products, implants, devices, and special equipment available  Patient identity confirmed: verbally with patient    Supporting Documentation  Indications: pain   Procedure Details  Location: hip - R hip joint  Preparation: Patient was prepped and draped in the usual sterile fashion  Needle size: 20 G (6" spinal needle)  Ultrasound guidance: yes  Medications administered: 80 mg triamcinolone acetonide 40 mg/mL; 4 mL bupivacaine 0.25 %    Patient tolerance: patient tolerated the procedure well with no immediate complications  Dressing:  Sterile dressing applied    Right HIP USG INTRA-ARTICULAR INJECTION:  Injection site cleaned with Betadine as well as alcohol prior to anesthetic injection using 5 cc of anesthetic without epinephrine prior to beginning ultrasound-guided  injection procedure. Injection site again cleaned with chlorhexidine prior to starting USG procedure. Sterile field, gloves, probe cover used with visualization of needle in-line with curvilinear transducer LAX to femoral neck with filling of joint capsule at head-neck junction with injectate of 4ml anesthetic plus steroid. Femoral artery and circumflex artery visualized medial to injection site on pre-scanning with doppler confirmation. No artery or nerve visualized in path of needle. Patient tolerated procedure well with minimal bleeding and no complications.

## 2023-07-11 NOTE — PATIENT INSTRUCTIONS
CORTICOSTEROID INJECTION  What is a corticosteroid? Injuries or disease such as arthritis, bursitis or tendonitis result in inflammation. In turn, this inflammation can cause swelling and pain. A local injection of a corticosteroid is provided to diminish inflammation. By doing so, it will also decrease pain and swelling which is making you uncomfortable. Is this the same thing as a Cortisone Injection? Cortisone® is a brand name of a corticosteroid used commonly in the past.  Today I commonly use a more water-soluble corticosteroid named Celestone®. Will the injection hurt? As with any injection, you may feel pain at the time of the injection. Typically, I use a local anesthetic (Zahira Jennifer) in addition to the corticosteroid to determine if the injection has been placed in the appropriate location. Hence it is important to monitor your symptoms 4-6 hours after the injection, as the area will be anesthetized (numb) while the local anesthetic is working. Once the local anesthetic wears off, the intensity of pain can be the same as it was prior to the injection, or even worse. This does not mean that the injection is not working. The corticosteroid may take 24-72 hours to begin having a positive effect. If you do experience an increase in pain, the use of an ice pack on the area for 20 minutes at a time should help. It is also helpful to take an oral anti-inflammatory such as Tylenol® or Motrin® if you are able to medically do so. For this reason it is best to avoid activities that put stress on the area the first 24 hours after the injection. How long will pain relief last?  This will vary according to the type and severity of the symptoms being treated and the severity of the condition. Symptom relief may last weeks to months. I typically couple injections with physical therapy so that the underlying problem causing the inflammation may be treated as the pain diminishes.   If the combination is not successful, you may be a surgical candidate. I have read bad things about steroids. Will these things happen to me? Corticosteroids, when utilized properly, are safe and effective drugs. When used in a low dose, potential adverse reactions are very rare. Some patients may experience a sensation of flushing for several days. Some can experience feelings of agitation. Others may have depigmentation and dimpling of the overlying skin at the site of the injection. Very rarely, there can be a local reaction which may include increased discomfort for a period of time in the areas that has been injected. A steroid should not be used over and over again. Multiple injections in the same area can produce adverse effects such as tissue atrophy and degeneration of tendon or cartilage. A small percentage of patients (< 0.1%) may develop an infection in the joint after injection. This is a treatable problem, but if neglected, may result in permanent disability. Signs of infection include redness, swelling, discharge, fevers/chills, increasing pain and drainage from the injection site. This represents an emergency and you should contact our office immediately or seek treatment in the ER if after hours. If I have diabetes, will this injection affect me? If you are diabetic, an injection of a corticosteroid can raise your blood sugar level, requiring more insulin for a brief period of time. This may necessitate careful blood sugar maintenance. If the elevated sugars are not able to be controlled, contact your diabetic doctor for guidance.

## 2023-07-19 DIAGNOSIS — M16.10 HIP ARTHRITIS: ICD-10-CM

## 2023-07-19 DIAGNOSIS — S76.309D HAMSTRING INJURY, SUBSEQUENT ENCOUNTER: ICD-10-CM

## 2023-07-19 RX ORDER — IBUPROFEN 800 MG/1
800 TABLET ORAL 2 TIMES DAILY WITH MEALS
Qty: 60 TABLET | Refills: 0 | Status: SHIPPED | OUTPATIENT
Start: 2023-07-19

## 2023-07-25 ENCOUNTER — PATIENT MESSAGE (OUTPATIENT)
Dept: INTERNAL MEDICINE CLINIC | Facility: OTHER | Age: 68
End: 2023-07-25

## 2023-07-26 DIAGNOSIS — F51.04 CHRONIC INSOMNIA: ICD-10-CM

## 2023-07-26 RX ORDER — ESZOPICLONE 1 MG/1
1 TABLET, FILM COATED ORAL
Qty: 30 TABLET | Refills: 0 | Status: SHIPPED | OUTPATIENT
Start: 2023-07-26

## 2023-07-28 DIAGNOSIS — G47.00 INSOMNIA, UNSPECIFIED TYPE: Primary | ICD-10-CM

## 2023-08-09 NOTE — ASSESSMENT & PLAN NOTE
Controlled with pantoprazole 40 mg daily PRN  Normal rate, regular rhythm.  Heart sounds S1, S2.  No murmurs, rubs or gallops.

## 2023-08-10 DIAGNOSIS — M85.88 OSTEOPENIA OF LUMBAR SPINE: ICD-10-CM

## 2023-08-14 ENCOUNTER — OFFICE VISIT (OUTPATIENT)
Dept: SLEEP CENTER | Facility: CLINIC | Age: 68
End: 2023-08-14
Payer: COMMERCIAL

## 2023-08-14 VITALS
HEIGHT: 64 IN | WEIGHT: 96 LBS | HEART RATE: 52 BPM | SYSTOLIC BLOOD PRESSURE: 100 MMHG | DIASTOLIC BLOOD PRESSURE: 68 MMHG | BODY MASS INDEX: 16.39 KG/M2

## 2023-08-14 DIAGNOSIS — F41.9 ANXIETY: ICD-10-CM

## 2023-08-14 DIAGNOSIS — Z56.6 STRESS AT WORK: ICD-10-CM

## 2023-08-14 DIAGNOSIS — G47.00 INSOMNIA, UNSPECIFIED TYPE: ICD-10-CM

## 2023-08-14 DIAGNOSIS — G89.4 CHRONIC PAIN DISORDER: ICD-10-CM

## 2023-08-14 DIAGNOSIS — G47.8 NON-RESTORATIVE SLEEP: ICD-10-CM

## 2023-08-14 DIAGNOSIS — R63.6 UNDERWEIGHT: ICD-10-CM

## 2023-08-14 DIAGNOSIS — F45.8 BRUXISM: ICD-10-CM

## 2023-08-14 DIAGNOSIS — R71.8 ELEVATED HEMATOCRIT: ICD-10-CM

## 2023-08-14 DIAGNOSIS — K21.9 GERD WITHOUT ESOPHAGITIS: ICD-10-CM

## 2023-08-14 DIAGNOSIS — F51.04 CHRONIC INSOMNIA: Primary | ICD-10-CM

## 2023-08-14 PROCEDURE — 99204 OFFICE O/P NEW MOD 45 MIN: CPT | Performed by: INTERNAL MEDICINE

## 2023-08-14 RX ORDER — TRAZODONE HYDROCHLORIDE 50 MG/1
75 TABLET ORAL
Qty: 45 TABLET | Refills: 2 | Status: SHIPPED | OUTPATIENT
Start: 2023-08-14

## 2023-08-14 SDOH — HEALTH STABILITY - MENTAL HEALTH: OTHER PHYSICAL AND MENTAL STRAIN RELATED TO WORK: Z56.6

## 2023-08-14 NOTE — PROGRESS NOTES
Consultation - [address]  79 y.o. female  :1955  UFR:[id number]  DOS:2023    Physician Requesting Consult: Teja Terry MD             Reason for Consult : At your kind request I saw Maryhelen Merlin for initial sleep evaluation today. Patient is here for a complaint of insomnia      PFSH, Problem List, Medications & Allergies were reviewed in EMR. Nancy Swann  has a past medical history of Kidney stones (2022) and Osteoporosis. She has a current medication list which includes the following prescription(s): eszopiclone, gabapentin, ibuprofen, lidocaine, methocarbamol, methylprednisolone, marcell, pantoprazole, and trazodone. HPI: She is here at behest of her PCP for sleep difficulties ongoing for decades. In the past she has been using Phenergan and is seeking a refill. She gave a somewhat contradictory history. She is on multiple medications and feels they are not working. 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 She characterized it as "the most stress I have ever had in my life "but has a contract for a year with still 5 months to go. She states she takes a "very minute dose of Phenergan and only infrequently " that helps her sleep when he is in dire situations. Previously stated she would like to wean off Lunesta but now states it helps her fall asleep. She sleeps alone and is not aware of snoring or breathing difficulties during sleep  Additional Complaints: She has chronic pain that is controlled on current medication. Restless Leg Syndrome: has typical symptoms on 1 occasion only had no recurrent;  Parasomnia: dentist reports teeth grinding during sleep;    Sleep Routine (averaged): Typical Bedtime: 10:30 PM.  Gets OOB: 4 AM. TIB:5.5 hrs, 8 hours no more on days off  Sleep latency: several hours. She denies seeing thoughts, use of electronics in the bedroom but does watch the clock. Sleep Interruptions:  infrequent, .  Awakens: needing an alarm Upon awakening: is not always refreshed. [She estimates getting 5 hrs sleep.] Daytime Function:Pauly denies excessive daytime sleepiness . She rated [herself] at Total score: 1 /24 on the [place] Sleepiness Scale. Habits:   reports that she has never smoked. She has never used smokeless tobacco.;  reports that she does not currently use alcohol.;[ reports that she does not currently use drugs after having used the following drugs: Amphetamines. ];[  E-Cigarette/Vaping   • E-Cigarette Use Never User    ]; Caffeine use:limited; Exercise routine: regular. Occupation: RN   Family History: Negative for sleep disturbance. ROS: Significant for weight has been stable. She denied nasal, respiratory or cardiac symptoms. She denies feelings of depression. EXAM:  /68   Pulse (!) 52   Ht 5' 3.5" (1.613 m)   Wt 43.5 kg (96 lb)   BMI 16.74 kg/m²    General: Well groomed female, well appearing, in no apparent distress. Neurological: Alert and orientated; cooperative; Cranial nerves intact;    Psychiatric: Speech: Clear and coherent; appears anxious and obsessing over sleep. Skin: Warm and dry; Color& Hydration good; ecchymoses on dorsal aspects of her hands  HEENT:  Craniofacial anatomy: normal Sinuses: Non-tender. TMJ: Normal   Eyes: EOM's intact; conjunctiva/corneas clear   Ears: Externally appear normal     Nasal Airway: assymetric nares Septum: Deviated; Mucosa: Normal; Turbinates: Normal; Rhinorrhea: None  Mouth: Lips: Normal posture; Dentition: normal . Mucosa: Moist; Hard Palate:normal    Oropharryx: crowdedTongue: Mallampati:Class III and MobileSoft Palate:  redundant  Tonsils: absent  Neck:; [Neck Circumference: 11 ";] Supple; no abnormal masses; Thyroid: Normal. Trachea: Central.    Lymph: No cervical or submandibular Lymhadenopathy  Heart: S1,S2 normal; RRR; no gallop; no murmur   Lungs: Respiratory Effort: Normal. Air entry good bilaterally. No wheezes.   No rales  Abdomen:  Soft & non-tender Extremities: No pedal edema. No clubbing or cyanosis. Musculoskeletal:  Motor normal; Gait: Normal.       Last CMP demonstrated CO2 of 29 mmol/L, total protein of 6.1 g/dL, bilirubin of 1.2 mg/dL and otherwise unremarkable. CBC demonstrated marginally elevated hemoglobin and hematocrit at 14.8 and 43.3 respectively. IMPRESSION: Primary/Secondary Sleep Diagnoses (to Medical or Psych conditions) & Comorbidities   1. Chronic insomnia  Ambulatory referral to behavioral health therapists    traZODone (DESYREL) 50 mg tablet      2. Non-restorative sleep        3. Bruxism        4. Stress at work  Ambulatory referral to behavioral health therapists      5. Anxiety  Ambulatory referral to behavioral health therapists      6. Chronic pain disorder        7. GERD without esophagitis        8. Underweight        9. Elevated hematocrit        10. Insomnia, unspecified type  Ambulatory Referral to Sleep Medicine           PLAN:   1. With respect to above conditions, comprehensive counseling provided on pathophysiology; effects on symptoms and comorbidities, diagnostic strategies & limitations; treatment options; risks or no treament; risks & benefits of the various therapeutic options; costs and insurance aspects. 2. Multi component Cognitive behavioral therapy for Insomnia undertaken - Sleep Restriction, Stimulus control, Relaxation techniques and Sleep hygiene were discussed. 3. Misconceptions, irrational and erroneous notions regarding insomnia were addressed. 4.  Increase use of hypnotic medications was not recommended at this time because most effective and enduring results are with behavioral therapies. 5. I provided educational materials and advised reading "No more sleepless nights " by Authors Rody. In addition,   6. With patient's consent, a prescription for CBT-I was provided. 7. Nocturnal polysomnography is warranted but she declined a diagnostic study.   If symptoms persist, would certainly be warranted especially given her history of bruxism, elevated CO2 and hemoglobin/hematocrit. 8.  She is taking relatively high dose of ibuprofen. In view of ecchymosis, I suggested dose reduction and supplementing with Tylenol no more than 1000 mg daily. 9.  I advised addressing her stress levels with her employer/supervisor. She states she is only a few more months to complete her contract and did not want to address the issue directly at source. 10.  Her use of injectable Phenergan as a sleep aid is inappropriate in my view and I have not given her a prescription to renew. 11.  If she is able to wean off Robaxin and reduce dose of gabapentin,  she may benefit from Remeron for anxiety that would also help with sleep and appetite. 12. She agreed with my recommendations but with some reservations. I invited her to seek a second opinion if she felt necessary. 13. Follow-up to be scheduled 2 to 3 months to monitor progress and, further details of treatment options and to adjust therapy. Sincerely,      Authenticated electronically on 23/04/57   Board Certified Specialist     Portions of the record may have been created with voice recognition software. Occasional wrong word or "sound a like" substitutions may have occurred due to the inherent limitations of voice recognition software. There may also be notations and random deletions of words or characters from malfunctioning software. Read the chart carefully and recognize, using context, where substitutions/deletions have occurred.

## 2023-08-14 NOTE — PATIENT INSTRUCTIONS
What you can do to improve your sleep: (Sleep Hygiene) Basic rules for a good night's sleep  Create a regular sleep schedule. This will help you form a sleep routine. Keep a record of your sleep patterns, and any sleeping problems you have. Bring the record to follow-up visits with healthcare providers. Avoid prolonged use of light-emitting screens before bedtime or watching TV in bed. Avoid forcing sleep. Do not take naps. Naps could make it hard for you to fall asleep at bedtime. Deal with your worries before bedtime. Keep your bedroom cool, quiet, and dark. Turn on white noise, such as a fan, to help you relax. Do not use your bed for any activity that will keep you awake. Do not read, exercise, eat, or watch TV in your bedroom. Get up if you do not fall asleep within 20 minutes. Move to another room and do something relaxing until you become sleepy. Limit caffeine, alcohol, nicotine and food to earlier in the day. Only drink caffeine in the morning. Do not drink alcohol within 6 hours of bedtime. Do not eat a heavy meal right before you go to bed. Avoid smoking, especially in the evening. Exercise regularly. Daily exercise will help you sleep better. Do not exercise within 4 hours of bedtime. Stimulus control therapy rules  1. Go to bed only when sleepy. 2. Do not watch television, read, eat, or worry while in bed. Use bed only for sleep and sex. 3. Get out of bed if unable to fall asleep within 20 minutes and go to another room. Return to bed only when sleepy. Repeat this step as many times as necessary throughout the night. 4. Set an alarm clock to wake up at a fixed time each morning, including weekends. 5. Do not take a nap during the day. Data from: [address], 1959 [facility] Nonpharmacologic treatments of insomnia. J Clin Psychiatry 1323; 53:37. Go to [employer] website for more information: Sleepeducation. org  Recommended Reading: Book by diogo Fine   No More sleepless nights  Nursing Support:  When: Monday through Friday 7A-5PM except holidays  Where: Our direct line is [phone]. If you are having a true emergency please call 911. In the event that the line is busy or it is after hours please leave a voice message and we will return your call. Please speak clearly, leaving your full name, birth date, best number to reach you and the reason for your call. Medication refills: We will need the name of the medication, the dosage, the ordering provider, whether you get a 30 or 90 day refill, and the pharmacy name and address. Medications will be ordered by the provider only. Nurses cannot call in prescriptions. Please allow 7 days for medication refills. Physician requested updates: If your provider requested that you call with an update after starting medication, please be ready to provide us the medication and dosage, what time you take your medication, the time you attempt to fall asleep, time you fall asleep, when you wake up, and what time you get out of bed. Sleep Study Results: We will contact you with sleep study results and/or next steps after the physician has reviewed your testing.

## 2023-08-19 DIAGNOSIS — S76.309D HAMSTRING INJURY, SUBSEQUENT ENCOUNTER: ICD-10-CM

## 2023-08-19 DIAGNOSIS — M16.10 HIP ARTHRITIS: ICD-10-CM

## 2023-08-21 RX ORDER — METHOCARBAMOL 500 MG/1
250 TABLET, FILM COATED ORAL 2 TIMES DAILY
Qty: 30 TABLET | Refills: 0 | Status: SHIPPED | OUTPATIENT
Start: 2023-08-21

## 2023-08-21 RX ORDER — IBUPROFEN 800 MG/1
800 TABLET ORAL 2 TIMES DAILY WITH MEALS
Qty: 60 TABLET | Refills: 0 | Status: SHIPPED | OUTPATIENT
Start: 2023-08-21

## 2023-08-28 DIAGNOSIS — F51.04 CHRONIC INSOMNIA: ICD-10-CM

## 2023-08-29 RX ORDER — ESZOPICLONE 1 MG/1
1 TABLET, FILM COATED ORAL
Qty: 30 TABLET | Refills: 0 | Status: SHIPPED | OUTPATIENT
Start: 2023-08-29 | End: 2023-08-30 | Stop reason: SDUPTHER

## 2023-08-30 RX ORDER — ESZOPICLONE 1 MG/1
1 TABLET, FILM COATED ORAL
Qty: 30 TABLET | Refills: 0 | Status: SHIPPED | OUTPATIENT
Start: 2023-08-30

## 2023-08-30 NOTE — TELEPHONE ENCOUNTER
Contacted patient's pharmacy they have not received medication request for eszopiclone (LUNESTA) 1 mg tablet.

## 2023-08-30 NOTE — TELEPHONE ENCOUNTER
[address] [facility] spoke to the pharmacist Adeline prescription was not received. Can you please resend script.

## 2023-09-25 ENCOUNTER — OFFICE VISIT (OUTPATIENT)
Dept: INTERNAL MEDICINE CLINIC | Facility: OTHER | Age: 68
End: 2023-09-25
Payer: COMMERCIAL

## 2023-09-25 VITALS
WEIGHT: 99.8 LBS | DIASTOLIC BLOOD PRESSURE: 68 MMHG | HEIGHT: 64 IN | TEMPERATURE: 98.1 F | RESPIRATION RATE: 16 BRPM | SYSTOLIC BLOOD PRESSURE: 122 MMHG | BODY MASS INDEX: 17.04 KG/M2 | HEART RATE: 68 BPM

## 2023-09-25 DIAGNOSIS — Z12.11 ENCOUNTER FOR COLORECTAL CANCER SCREENING: ICD-10-CM

## 2023-09-25 DIAGNOSIS — Z12.12 ENCOUNTER FOR COLORECTAL CANCER SCREENING: ICD-10-CM

## 2023-09-25 DIAGNOSIS — M16.10 HIP ARTHRITIS: ICD-10-CM

## 2023-09-25 DIAGNOSIS — K21.9 GERD WITHOUT ESOPHAGITIS: ICD-10-CM

## 2023-09-25 DIAGNOSIS — S76.309D HAMSTRING INJURY, SUBSEQUENT ENCOUNTER: ICD-10-CM

## 2023-09-25 DIAGNOSIS — M85.88 OSTEOPENIA OF LUMBAR SPINE: ICD-10-CM

## 2023-09-25 DIAGNOSIS — F51.04 CHRONIC INSOMNIA: ICD-10-CM

## 2023-09-25 DIAGNOSIS — M16.0 PRIMARY OSTEOARTHRITIS OF HIPS, BILATERAL: ICD-10-CM

## 2023-09-25 DIAGNOSIS — M81.0 AGE-RELATED OSTEOPOROSIS WITHOUT CURRENT PATHOLOGICAL FRACTURE: ICD-10-CM

## 2023-09-25 DIAGNOSIS — N64.82 MICROMASTIA: ICD-10-CM

## 2023-09-25 DIAGNOSIS — R19.5 POSITIVE COLORECTAL CANCER SCREENING USING COLOGUARD TEST: Primary | ICD-10-CM

## 2023-09-25 PROCEDURE — 99214 OFFICE O/P EST MOD 30 MIN: CPT | Performed by: INTERNAL MEDICINE

## 2023-09-25 RX ORDER — PANTOPRAZOLE SODIUM 40 MG/1
40 TABLET, DELAYED RELEASE ORAL DAILY
Qty: 90 TABLET | Refills: 1 | Status: SHIPPED | OUTPATIENT
Start: 2023-09-25

## 2023-09-25 RX ORDER — METHYLPREDNISOLONE 4 MG/1
TABLET ORAL
Qty: 21 EACH | Refills: 0 | Status: SHIPPED | OUTPATIENT
Start: 2023-09-25

## 2023-09-25 RX ORDER — ESZOPICLONE 1 MG/1
1 TABLET, FILM COATED ORAL
Qty: 30 TABLET | Refills: 0 | Status: SHIPPED | OUTPATIENT
Start: 2023-09-25

## 2023-09-25 RX ORDER — GABAPENTIN 600 MG/1
300 TABLET ORAL 2 TIMES DAILY PRN
Qty: 180 TABLET | Refills: 1 | Status: SHIPPED | OUTPATIENT
Start: 2023-09-25

## 2023-09-25 RX ORDER — TRAZODONE HYDROCHLORIDE 50 MG/1
50 TABLET ORAL
Qty: 90 TABLET | Refills: 1 | Status: SHIPPED | OUTPATIENT
Start: 2023-09-25

## 2023-09-25 NOTE — ASSESSMENT & PLAN NOTE
She would like to defer on starting treatment at this time. She will increase her calcium and vitamin D supplementation. She feels it may be due to previous steroid injections which she is no longer receiving.

## 2023-09-25 NOTE — PROGRESS NOTES
Assessment/Plan:    GERD without esophagitis  Controlled, continue with pantoprazole 40 mg daily. Hip arthritis  Discussed continuing ROM and stretching exercise. Continue gabapentin, tylenol/NSAIDs, and PRN methocarbamol. Osteopenia of lumbar spine  continue calcium and vitamin D supplementation. Chronic insomnia  She has established care with sleep medicine. Continue lunesta 1 mg HS and trazodone 50 mg HS. She would like to continue Phenergan at bedtime however I discussed with her my reluctance for prescribing it for this use. She will try to wean off what remaining Phenergan she has in hopes that she may no longer needed for insomnia. Osteoporosis  She would like to defer on starting treatment at this time. She will increase her calcium and vitamin D supplementation. She feels it may be due to previous steroid injections which she is no longer receiving. Diagnoses and all orders for this visit:    Positive colorectal cancer screening using Cologuard test    GERD without esophagitis  -     pantoprazole (PROTONIX) 40 mg tablet; Take 1 tablet (40 mg total) by mouth daily    Hip arthritis  -     gabapentin (NEURONTIN) 600 MG tablet; Take 0.5 tablets (300 mg total) by mouth 2 (two) times a day as needed (pain)  -     methylPREDNISolone 4 MG tablet therapy pack; Use as directed on package    Osteopenia of lumbar spine    Chronic insomnia  -     eszopiclone (LUNESTA) 1 mg tablet; Take 1 tablet (1 mg total) by mouth daily at bedtime as needed for sleep Take immediately before bedtime  -     traZODone (DESYREL) 50 mg tablet; Take 1 tablet (50 mg total) by mouth daily at bedtime    Hamstring injury, subsequent encounter  -     gabapentin (NEURONTIN) 600 MG tablet;  Take 0.5 tablets (300 mg total) by mouth 2 (two) times a day as needed (pain)  -     methylPREDNISolone 4 MG tablet therapy pack; Use as directed on package    Primary osteoarthritis of hips, bilateral  -     methylPREDNISolone 4 MG tablet therapy pack; Use as directed on package    Encounter for colorectal cancer screening  -     Cologuard    Micromastia  -     US breast screening bilateral complete (ABUS); Future    Age-related osteoporosis without current pathological fracture            Depression Screening and Follow-up Plan: Patient was screened for depression during today's encounter. They screened negative with a PHQ-2 score of 0. Subjective:      Patient ID: Nai Pierson is a 79 y.o. female. Chief Complaint   Patient presents with   • Follow-up     6 month - labs 5/1/23 - no issues   • hm     Annual, mammogram ( REFUSED ) fall risk, phq, urine incont, bmi follow up plan, + cologuard   • Medication Refill     Wants to discuss her medications before refills are put in       51-year-old female seen today for follow-up of chronic conditions. Recent laboratory studies reviewed today. She has establish care with sleep medicine who recommended decreasing her overall medication use. She has cut down on gabapentin, currently taking 300 mg at bedtime, taking NSAIDs only at bedtime, and methocarbamol only at bedtime. She takes Lunesta 1 mg and trazodone 50 mg at bedtime for sleep. She sleeps approximately 6-6.5 hrs a night. She would like a prescription for Phenergan as this was given to her in the past and allows her to relax for sleep. She would like to retest for cologuard as she was experiencing GI bleeding and feels the positive test is a false positive. Medication Refill  Pertinent negatives include no abdominal pain, chest pain, chills, congestion, coughing, diaphoresis, fatigue, fever, headaches, nausea, numbness, sore throat, vomiting or weakness.        The following portions of the patient's history were reviewed and updated as appropriate: allergies, current medications, past family history, past medical history, past social history, past surgical history and problem list.    Review of Systems Constitutional: Negative for activity change, appetite change, chills, diaphoresis, fatigue and fever. HENT: Negative for congestion, postnasal drip, rhinorrhea, sinus pressure, sinus pain, sneezing and sore throat. Eyes: Negative for visual disturbance. Respiratory: Negative for apnea, cough, choking, chest tightness, shortness of breath and wheezing. Cardiovascular: Negative for chest pain, palpitations and leg swelling. Gastrointestinal: Negative for abdominal distention, abdominal pain, anal bleeding, blood in stool, constipation, diarrhea, nausea and vomiting. Endocrine: Negative for cold intolerance and heat intolerance. Genitourinary: Negative for difficulty urinating, dysuria and hematuria. Musculoskeletal: Negative. Skin: Negative. Neurological: Negative for dizziness, weakness, light-headedness, numbness and headaches. Hematological: Negative for adenopathy. Psychiatric/Behavioral: Negative for agitation, sleep disturbance and suicidal ideas. All other systems reviewed and are negative.         Past Medical History:   Diagnosis Date   • Kidney stones 11/23/2022   • Osteoporosis          Current Outpatient Medications:   •  eszopiclone (LUNESTA) 1 mg tablet, Take 1 tablet (1 mg total) by mouth daily at bedtime as needed for sleep Take immediately before bedtime, Disp: 30 tablet, Rfl: 0  •  gabapentin (NEURONTIN) 600 MG tablet, Take 0.5 tablets (300 mg total) by mouth 2 (two) times a day as needed (pain), Disp: 180 tablet, Rfl: 1  •  ibuprofen (MOTRIN) 800 mg tablet, Take 1 tablet (800 mg total) by mouth 2 (two) times a day with meals 2-3 times daily, Disp: 60 tablet, Rfl: 0  •  lidocaine (LIDODERM) 5 %, Apply 1 patch topically over 24 hours daily, Disp: 30 patch, Rfl: 5  •  methocarbamol (ROBAXIN) 500 mg tablet, Take 0.5 tablets (250 mg total) by mouth 2 (two) times a day Takes 250 mg 2 times daily, Disp: 30 tablet, Rfl: 0  •  methylPREDNISolone 4 MG tablet therapy pack, Use as directed on package, Disp: 21 each, Rfl: 0  •  Nutritional Supplements (Deandre) POWD, Take 1 Package by mouth in the morning, Disp: , Rfl:   •  pantoprazole (PROTONIX) 40 mg tablet, Take 1 tablet (40 mg total) by mouth daily, Disp: 90 tablet, Rfl: 1  •  traZODone (DESYREL) 50 mg tablet, Take 1 tablet (50 mg total) by mouth daily at bedtime, Disp: 90 tablet, Rfl: 1    No Known Allergies    Social History   Past Surgical History:   Procedure Laterality Date   • ROTATOR CUFF REPAIR Right      Family History   Problem Relation Age of Onset   • Lung cancer Mother    • Breast cancer Maternal Aunt        Objective:  /68 (BP Location: Left arm, Patient Position: Sitting, Cuff Size: Standard)   Pulse 68   Temp 98.1 °F (36.7 °C) (Temporal)   Resp 16   Ht 5' 3.5" (1.613 m)   Wt 45.3 kg (99 lb 12.8 oz)   BMI 17.40 kg/m²     No results found for this or any previous visit (from the past 1344 hour(s)). Physical Exam  Vitals and nursing note reviewed. Constitutional:       General: She is not in acute distress. Appearance: She is well-developed. She is not diaphoretic. HENT:      Head: Normocephalic and atraumatic. Eyes:      General:         Right eye: No discharge. Left eye: No discharge. Conjunctiva/sclera: Conjunctivae normal.      Pupils: Pupils are equal, round, and reactive to light. Neck:      Thyroid: No thyromegaly. Vascular: No JVD. Cardiovascular:      Rate and Rhythm: Normal rate and regular rhythm. Heart sounds: Normal heart sounds. No murmur heard. No friction rub. No gallop. Pulmonary:      Effort: Pulmonary effort is normal. No respiratory distress. Breath sounds: Normal breath sounds. No wheezing or rales. Chest:      Chest wall: No tenderness. Abdominal:      General: There is no distension. Palpations: Abdomen is soft. Tenderness: There is no abdominal tenderness. Musculoskeletal:         General: No tenderness or deformity. Normal range of motion. Cervical back: Normal range of motion and neck supple. Lymphadenopathy:      Cervical: No cervical adenopathy. Skin:     General: Skin is warm and dry. Coloration: Skin is not pale. Findings: No erythema or rash. Neurological:      Mental Status: She is alert and oriented to person, place, and time. Cranial Nerves: No cranial nerve deficit. Coordination: Coordination normal.   Psychiatric:         Behavior: Behavior normal.         Thought Content:  Thought content normal.         Judgment: Judgment normal.

## 2023-09-25 NOTE — ASSESSMENT & PLAN NOTE
She has established care with sleep medicine. Continue lunesta 1 mg HS and trazodone 50 mg HS. She would like to continue Phenergan at bedtime however I discussed with her my reluctance for prescribing it for this use. She will try to wean off what remaining Phenergan she has in hopes that she may no longer needed for insomnia.

## 2023-09-25 NOTE — ASSESSMENT & PLAN NOTE
Discussed continuing ROM and stretching exercise. Continue gabapentin, tylenol/NSAIDs, and PRN methocarbamol.

## 2023-10-24 DIAGNOSIS — F51.04 CHRONIC INSOMNIA: ICD-10-CM

## 2023-10-24 RX ORDER — ESZOPICLONE 1 MG/1
1 TABLET, FILM COATED ORAL
Qty: 30 TABLET | Refills: 0 | Status: SHIPPED | OUTPATIENT
Start: 2023-10-24

## 2024-03-29 ENCOUNTER — TELEPHONE (OUTPATIENT)
Dept: INTERNAL MEDICINE CLINIC | Facility: OTHER | Age: 69
End: 2024-03-29